# Patient Record
Sex: FEMALE | Race: WHITE | URBAN - METROPOLITAN AREA
[De-identification: names, ages, dates, MRNs, and addresses within clinical notes are randomized per-mention and may not be internally consistent; named-entity substitution may affect disease eponyms.]

---

## 2023-01-06 ENCOUNTER — OFFICE VISIT (OUTPATIENT)
Dept: ORTHOPEDIC SURGERY | Age: 43
End: 2023-01-06
Payer: COMMERCIAL

## 2023-01-06 DIAGNOSIS — R52 PAIN: ICD-10-CM

## 2023-01-06 DIAGNOSIS — M25.572 ACUTE LEFT ANKLE PAIN: Primary | ICD-10-CM

## 2023-01-06 DIAGNOSIS — M25.572 CHRONIC PAIN OF LEFT ANKLE: ICD-10-CM

## 2023-01-06 DIAGNOSIS — R52 PAIN: Primary | ICD-10-CM

## 2023-01-06 DIAGNOSIS — G89.29 CHRONIC PAIN OF LEFT ANKLE: ICD-10-CM

## 2023-01-06 PROCEDURE — L4396 STATIC OR DYNAMI AFO PRE CST: HCPCS | Performed by: ORTHOPAEDIC SURGERY

## 2023-01-06 PROCEDURE — 99204 OFFICE O/P NEW MOD 45 MIN: CPT | Performed by: ORTHOPAEDIC SURGERY

## 2023-01-06 RX ORDER — QUETIAPINE FUMARATE 100 MG/1
TABLET, FILM COATED ORAL
COMMUNITY
Start: 2022-10-11

## 2023-01-06 RX ORDER — MULTIVIT WITH MINERALS/LUTEIN
250 TABLET ORAL DAILY
COMMUNITY

## 2023-01-06 RX ORDER — MELOXICAM 15 MG/1
15 TABLET ORAL DAILY
Qty: 30 TABLET | Refills: 3 | Status: SHIPPED | OUTPATIENT
Start: 2023-01-06

## 2023-01-06 RX ORDER — THIAMINE MONONITRATE (VIT B1) 100 MG
TABLET ORAL DAILY
COMMUNITY

## 2023-01-06 RX ORDER — ESCITALOPRAM OXALATE 10 MG/1
TABLET ORAL
COMMUNITY
Start: 2022-10-11

## 2023-01-06 RX ORDER — FERROUS SULFATE 7.5 MG/0.5
15 SYRINGE (EA) ORAL DAILY
COMMUNITY

## 2023-01-06 NOTE — PROGRESS NOTES
Name: Kelly Sultana  YOB: 1980  Gender: female  MRN: [id number]    Summary: Bilateral plantars fasciitis. Night splints, home stretching exercises, meloxicam, power step inserts, [place] tennis shoes. If not improving will need alternative steroids and therapy. Bipolar 2 cannot take oral steroids    No x-rays needed       CC: Bilateral heel pain    HPI: Kelly Sultana is a 37 y.o. female presents today with medial arch and plantar heel/foot pain. The pain is a tearing burning and sharp sensation stabbing them in the heel. It is aggravated worse in the morning when they are getting out of bed are once they have been sitting around and try to stand back up. They state the morning time is the worst time and then throughout the day it is still very painful. This is been going on for 6 months. She recently bought Track[employer] which do help. She works on her feet at [employer] which she stands 12 hours on the day. She continues to have this heel pain. It limits her life. Her family including her boyfriend and son help her by icing it and massaging her foot. ROS/Meds/PSH/PMH/FH/SH: I personally reviewed the patients standard intake form. Below are the pertinents    Tobacco:  has no history on file for tobacco use. Diabetes: None  Other: none, bipolar    Physical Examination:  Bilateral Lower Extremity: FROM actively of toes, foot, ankle, knee and hip. No instability of foot or ankle with drawer and stress. 55 strength to TAEHLGSCPeronealsPTib. No skin lesions. TTP at the medial calcaneal insertion of the plantar fascia. This spot is very painful. Gastroc Contracture noted on silverskoid exam: With the hindfoot in neutral and forefoot supinated ankle dorsiflexion is diminished with knee in extension when compared to the knee at 90deg  Cavovarus foot posture when standing.    Talar tilt exam : normal  Anterior drawer exam w/ ankle plantarflexed at 20 deg: normal    Neuro:  normal SILT to s/s/sp/dp/t. Reflexes normal: 1+ patella reflex bilaterally, 1+ achilles reflex bilaterally, negative babinski bilaterally. no signs of hyper reflexia or absent reflex    Vascular: radial=4/4, femoral=4/4, popliteal=4/4, dorsalis pedis=4/4,     Imaging:   I independently interpreted XR taken today and   BILATERAL and X-Ray 3 vw Foot (AP/Lateral/Oblique) for foot pain   Findings: No signs of fracture or dislocations. The TMT joints are aligned, there are no signs of neoplastic disease, or chronic disease. The midtarsal and subtalar joints appear normal.  Both feet are symmetric however with large plantar enthesophytes to the plantar fascial insertion with some Jag's deformities noted on both sides with a little bit of enthesophytes noted the Achilles insertion. Impression:  Normal foot   Signature: Everett Matthew MD       BILATERAL and X-Ray Ankle 3 vw (AP/Lateral/Oblique) for ankle pain   Findings: No signs of displacement of the mortise. No signs of acute injury or fracture to the talus, mortise, fibula, or distal tibia. No signs of chronic damage, neoplastic process or infection   Impression:  Stable ankle    Signature: Everett Matthew MD       Assessment:   Bilateral plantars fasciitis   Bilateral gastroc contractures    Plan:   4 This is a chronic illness/condition with exacerbation and progression  Treatment at this time: Night splint, prescription drug management, power step inserts, good tennis shoes at home stretching exercise program.  Studies ordered: NO XR needed @ Next Visit    Weight-bearing status: WBAT        Return to work/work restrictions: none  Mobic 15mg p.o. qday x 14 days: An Rx was given. We discussed the use of Mobic. I advised not to combine it with other NSAIDS such as advil, motrin, nor aleve. I discussed Mobic and its affect on the GI system, its risk of ulcer formation/exacerbation.  I also discussed its affects on the kidneys and risk of nephritis and kidney damage. We discussed how it can alter your blood coagulability and limit platelet function, its negative affect on coronary artery disease, and how excessive alcohol use with Mobic can make all these problems worse.      Sonido Grace MD

## 2023-01-06 NOTE — PROGRESS NOTES
Patient was prescribed a Night splint for the patient's bilateral foot. The patient wears a size 7.5 shoe and I fitted the patient with a S size night splint. Additionally, I instructed the patient on proper use and care of device as well as ensuring patient could safely get device on and off. I explained to the patient that wearing the splint, the foot is held in a certain position, called dorsiflexion. This means that the fascia is stretched, not allowing it to contract and become tighter overnight. The great thing about a night splint is that the remedy is quite gentle and the fascia will be returned to its proper length over a period of time. Once stretched out, the plantar fascia will become less tense and therefore will cause less pain. Patient read and signed documenting they understand and agree to [facility]'s current DME return policy.

## 2023-11-27 ENCOUNTER — HOSPITAL ENCOUNTER (EMERGENCY)
Age: 43
Discharge: HOME | End: 2023-11-27
Attending: EMERGENCY MEDICINE
Payer: COMMERCIAL

## 2023-11-27 VITALS
BODY MASS INDEX: 43.87 KG/M2 | TEMPERATURE: 97.8 F | RESPIRATION RATE: 18 BRPM | WEIGHT: 257 LBS | OXYGEN SATURATION: 98 % | HEART RATE: 82 BPM | SYSTOLIC BLOOD PRESSURE: 148 MMHG | HEIGHT: 64 IN | DIASTOLIC BLOOD PRESSURE: 85 MMHG

## 2023-11-27 DIAGNOSIS — U07.1 COVID: Primary | ICD-10-CM

## 2023-11-27 LAB
FLUAV RNA SPEC QL NAA+PROBE: NOT DETECTED
FLUBV RNA SPEC QL NAA+PROBE: NOT DETECTED
SARS-COV-2 RDRP RESP QL NAA+PROBE: DETECTED
SOURCE: ABNORMAL
STREP, MOLECULAR: NOT DETECTED

## 2023-11-27 PROCEDURE — 99283 EMERGENCY DEPT VISIT LOW MDM: CPT

## 2023-11-27 PROCEDURE — 87635 SARS-COV-2 COVID-19 AMP PRB: CPT

## 2023-11-27 PROCEDURE — 87651 STREP A DNA AMP PROBE: CPT

## 2023-11-27 PROCEDURE — 87502 INFLUENZA DNA AMP PROBE: CPT

## 2023-11-27 RX ORDER — ALBUTEROL SULFATE 90 UG/1
2 AEROSOL, METERED RESPIRATORY (INHALATION) EVERY 6 HOURS PRN
Qty: 18 G | Refills: 3 | Status: SHIPPED | OUTPATIENT
Start: 2023-11-27

## 2023-11-27 RX ORDER — BENZONATATE 200 MG/1
200 CAPSULE ORAL 3 TIMES DAILY PRN
Qty: 30 CAPSULE | Refills: 0 | Status: SHIPPED | OUTPATIENT
Start: 2023-11-27 | End: 2023-12-04

## 2023-11-27 RX ORDER — NIRMATRELVIR AND RITONAVIR 150-100 MG
KIT ORAL
Qty: 20 TABLET | Refills: 0 | Status: SHIPPED | OUTPATIENT
Start: 2023-11-27 | End: 2023-12-02

## 2023-11-27 ASSESSMENT — ENCOUNTER SYMPTOMS: COUGH: 1

## 2023-11-27 NOTE — DISCHARGE INSTRUCTIONS
We would love to help you get a primary care doctor for follow-up after your emergency department visit. Please call [phone] between 7AM - 6PM Monday to Friday. A care navigator will be able to assist you with setting up a doctor close to your home.

## 2023-11-27 NOTE — ED PROVIDER NOTES
Emergency Department Provider Note       PCP: None, None   Age: 37 y.o. Sex: female     DISPOSITION Decision To Discharge 11/27/2023 09:34:54 AM       ICD-10-CM    1. COVID  U07.1           Medical Decision Making     Complexity of Problems Addressed:  1 or more acute illnesses that pose a threat to life or bodily function. Data Reviewed and Analyzed:   I independently ordered and reviewed each unique test.  I reviewed external records: provider visit note from PCP. Discussion of management or test interpretation. Patient is a 40-year-old female with a week of cold cough congestion and intermittent wheezing. Patient did have a sore throat earlier in the course of the symptoms. Patient denies any shortness of breath. Patient denies any tobacco or alcohol denies any other complaints and denies any shortness of breath. Patient states cough is nonproductive. Differential diagnosis includes but is not limited to COVID, flu, strep    Patient's physical exam is unremarkable lungs are clear to auscultation bilaterally. Patient's COVID test is positive here. Patient's O2 sat is 98% on room air with respiratory rate of 18 and patient has no significant respiratory distress. We will DC home with a prescription for Paxlovid, Tessalon Perles, albuterol MDI and have patient follow-up as needed. All questions answered. Risk of Complications and/or Morbidity of Patient Management:  Prescription drug management performed. Shared medical decision making was utilized in creating the patients health plan today. Is this patient to be included in the SEP-1 core measure due to severe sepsis or septic shock? No Exclusion criteria - the patient is NOT to be included for SEP-1 Core Measure due to: Infection is not suspected      History      Patient is a 40-year-old female with a week of cold cough congestion and intermittent wheezing.   Patient did have a sore throat earlier in the course of

## 2023-12-04 ENCOUNTER — HOSPITAL ENCOUNTER (EMERGENCY)
Age: 43
Discharge: HOME | End: 2023-12-05
Attending: EMERGENCY MEDICINE
Payer: COMMERCIAL

## 2023-12-04 ENCOUNTER — APPOINTMENT (OUTPATIENT)
Dept: GENERAL RADIOLOGY | Age: 43
End: 2023-12-04
Payer: COMMERCIAL

## 2023-12-04 ENCOUNTER — APPOINTMENT (OUTPATIENT)
Dept: CT IMAGING | Age: 43
End: 2023-12-04
Payer: COMMERCIAL

## 2023-12-04 DIAGNOSIS — E87.6 HYPOKALEMIA: Primary | ICD-10-CM

## 2023-12-04 LAB
ALBUMIN SERPL-MCNC: 3.7 G/DL (ref 3.5–5)
ALBUMIN/GLOB SERPL: 0.8 (ref 0.4–1.6)
ALP SERPL-CCNC: 99 U/L (ref 50–136)
ALT SERPL-CCNC: 34 U/L (ref 12–65)
ANION GAP SERPL CALC-SCNC: 3 MMOL/L (ref 2–11)
AST SERPL-CCNC: 52 U/L (ref 15–37)
BASOPHILS # BLD: 0 K/UL (ref 0–0.2)
BASOPHILS NFR BLD: 0 % (ref 0–2)
BILIRUB SERPL-MCNC: 0.4 MG/DL (ref 0.2–1.1)
BUN SERPL-MCNC: 13 MG/DL (ref 6–23)
CALCIUM SERPL-MCNC: 8.6 MG/DL (ref 8.3–10.4)
CHLORIDE SERPL-SCNC: 105 MMOL/L (ref 101–110)
CO2 SERPL-SCNC: 25 MMOL/L (ref 21–32)
CREAT SERPL-MCNC: 0.7 MG/DL (ref 0.6–1)
D DIMER PPP FEU-MCNC: 1.38 UG/ML(FEU)
DIFFERENTIAL METHOD BLD: ABNORMAL
EOSINOPHIL # BLD: 0 K/UL (ref 0–0.8)
EOSINOPHIL NFR BLD: 1 % (ref 0.5–7.8)
ERYTHROCYTE [DISTWIDTH] IN BLOOD BY AUTOMATED COUNT: 15.9 % (ref 11.9–14.6)
FLUAV RNA SPEC QL NAA+PROBE: NOT DETECTED
FLUBV RNA SPEC QL NAA+PROBE: NOT DETECTED
GLOBULIN SER CALC-MCNC: 4.4 G/DL (ref 2.8–4.5)
GLUCOSE SERPL-MCNC: 117 MG/DL (ref 65–100)
HCT VFR BLD AUTO: 40.3 % (ref 35.8–46.3)
HGB BLD-MCNC: 13.1 G/DL (ref 11.7–15.4)
IMM GRANULOCYTES # BLD AUTO: 0 K/UL (ref 0–0.5)
IMM GRANULOCYTES NFR BLD AUTO: 1 % (ref 0–5)
LYMPHOCYTES # BLD: 1.2 K/UL (ref 0.5–4.6)
LYMPHOCYTES NFR BLD: 19 % (ref 13–44)
MAGNESIUM SERPL-MCNC: 2.4 MG/DL (ref 1.8–2.4)
MCH RBC QN AUTO: 28.9 PG (ref 26.1–32.9)
MCHC RBC AUTO-ENTMCNC: 32.5 G/DL (ref 31.4–35)
MCV RBC AUTO: 89 FL (ref 82–102)
MONOCYTES # BLD: 0.5 K/UL (ref 0.1–1.3)
MONOCYTES NFR BLD: 8 % (ref 4–12)
NEUTS SEG # BLD: 4.3 K/UL (ref 1.7–8.2)
NEUTS SEG NFR BLD: 71 % (ref 43–78)
NRBC # BLD: 0 K/UL (ref 0–0.2)
PLATELET # BLD AUTO: 255 K/UL (ref 150–450)
PMV BLD AUTO: 10.9 FL (ref 9.4–12.3)
POTASSIUM SERPL-SCNC: 2.1 MMOL/L (ref 3.5–5.1)
POTASSIUM SERPL-SCNC: 5.9 MMOL/L (ref 3.5–5.1)
PROT SERPL-MCNC: 8.1 G/DL (ref 6.3–8.2)
RBC # BLD AUTO: 4.53 M/UL (ref 4.05–5.2)
SODIUM SERPL-SCNC: 133 MMOL/L (ref 133–143)
WBC # BLD AUTO: 6 K/UL (ref 4.3–11.1)

## 2023-12-04 PROCEDURE — 96365 THER/PROPH/DIAG IV INF INIT: CPT

## 2023-12-04 PROCEDURE — 71045 X-RAY EXAM CHEST 1 VIEW: CPT

## 2023-12-04 PROCEDURE — 2580000003 HC RX 258

## 2023-12-04 PROCEDURE — 85379 FIBRIN DEGRADATION QUANT: CPT

## 2023-12-04 PROCEDURE — 96366 THER/PROPH/DIAG IV INF ADDON: CPT

## 2023-12-04 PROCEDURE — 93005 ELECTROCARDIOGRAM TRACING: CPT | Performed by: EMERGENCY MEDICINE

## 2023-12-04 PROCEDURE — 71260 CT THORAX DX C+: CPT

## 2023-12-04 PROCEDURE — 94640 AIRWAY INHALATION TREATMENT: CPT

## 2023-12-04 PROCEDURE — 6360000004 HC RX CONTRAST MEDICATION

## 2023-12-04 PROCEDURE — 99285 EMERGENCY DEPT VISIT HI MDM: CPT

## 2023-12-04 PROCEDURE — 80053 COMPREHEN METABOLIC PANEL: CPT

## 2023-12-04 PROCEDURE — 85025 COMPLETE CBC W/AUTO DIFF WBC: CPT

## 2023-12-04 PROCEDURE — 96361 HYDRATE IV INFUSION ADD-ON: CPT

## 2023-12-04 PROCEDURE — 87502 INFLUENZA DNA AMP PROBE: CPT

## 2023-12-04 PROCEDURE — 84132 ASSAY OF SERUM POTASSIUM: CPT

## 2023-12-04 PROCEDURE — 6360000002 HC RX W HCPCS

## 2023-12-04 PROCEDURE — 6370000000 HC RX 637 (ALT 250 FOR IP)

## 2023-12-04 PROCEDURE — 94762 N-INVAS EAR/PLS OXIMTRY CONT: CPT

## 2023-12-04 PROCEDURE — 83735 ASSAY OF MAGNESIUM: CPT

## 2023-12-04 RX ORDER — POTASSIUM CHLORIDE 20 MEQ/1
40 TABLET, EXTENDED RELEASE ORAL ONCE
Status: COMPLETED | OUTPATIENT
Start: 2023-12-04 | End: 2023-12-05

## 2023-12-04 RX ORDER — IPRATROPIUM BROMIDE AND ALBUTEROL SULFATE 2.5; .5 MG/3ML; MG/3ML
1 SOLUTION RESPIRATORY (INHALATION)
Status: COMPLETED | OUTPATIENT
Start: 2023-12-04 | End: 2023-12-04

## 2023-12-04 RX ORDER — DEXAMETHASONE SODIUM PHOSPHATE 10 MG/ML
6 INJECTION INTRAMUSCULAR; INTRAVENOUS
Status: COMPLETED | OUTPATIENT
Start: 2023-12-04 | End: 2023-12-04

## 2023-12-04 RX ORDER — 0.9 % SODIUM CHLORIDE 0.9 %
1000 INTRAVENOUS SOLUTION INTRAVENOUS ONCE
Status: COMPLETED | OUTPATIENT
Start: 2023-12-04 | End: 2023-12-04

## 2023-12-04 RX ORDER — POTASSIUM CHLORIDE 7.45 MG/ML
10 INJECTION INTRAVENOUS
Status: COMPLETED | OUTPATIENT
Start: 2023-12-05 | End: 2023-12-05

## 2023-12-04 RX ADMIN — IPRATROPIUM BROMIDE AND ALBUTEROL SULFATE 1 DOSE: .5; 3 SOLUTION RESPIRATORY (INHALATION) at 22:07

## 2023-12-04 RX ADMIN — SODIUM CHLORIDE 1000 ML: 9 INJECTION, SOLUTION INTRAVENOUS at 22:19

## 2023-12-04 RX ADMIN — DEXAMETHASONE SODIUM PHOSPHATE 6 MG: 10 INJECTION INTRAMUSCULAR; INTRAVENOUS at 22:18

## 2023-12-04 RX ADMIN — IOPAMIDOL 100 ML: 755 INJECTION, SOLUTION INTRAVENOUS at 23:46

## 2023-12-04 ASSESSMENT — ENCOUNTER SYMPTOMS: COUGH: 1

## 2023-12-04 ASSESSMENT — PAIN - FUNCTIONAL ASSESSMENT: PAIN_FUNCTIONAL_ASSESSMENT: NONE - DENIES PAIN

## 2023-12-05 VITALS
WEIGHT: 287.2 LBS | TEMPERATURE: 97.8 F | BODY MASS INDEX: 49.03 KG/M2 | SYSTOLIC BLOOD PRESSURE: 128 MMHG | HEART RATE: 75 BPM | OXYGEN SATURATION: 98 % | RESPIRATION RATE: 16 BRPM | DIASTOLIC BLOOD PRESSURE: 68 MMHG | HEIGHT: 64 IN

## 2023-12-05 PROBLEM — E87.6 HYPOKALEMIA: Status: ACTIVE | Noted: 2023-12-05

## 2023-12-05 LAB
ALBUMIN SERPL-MCNC: 3.4 G/DL (ref 3.5–5)
ALBUMIN/GLOB SERPL: 0.9 (ref 0.4–1.6)
ALP SERPL-CCNC: 98 U/L (ref 50–136)
ALT SERPL-CCNC: 31 U/L (ref 12–65)
ANION GAP SERPL CALC-SCNC: 6 MMOL/L (ref 2–11)
AST SERPL-CCNC: 22 U/L (ref 15–37)
BILIRUB SERPL-MCNC: 0.3 MG/DL (ref 0.2–1.1)
BUN SERPL-MCNC: 10 MG/DL (ref 6–23)
CALCIUM SERPL-MCNC: 8.4 MG/DL (ref 8.3–10.4)
CHLORIDE SERPL-SCNC: 109 MMOL/L (ref 103–113)
CO2 SERPL-SCNC: 19 MMOL/L (ref 21–32)
CREAT SERPL-MCNC: 0.6 MG/DL (ref 0.6–1)
EKG ATRIAL RATE: 103 BPM
EKG DIAGNOSIS: NORMAL
EKG P AXIS: 49 DEGREES
EKG P-R INTERVAL: 124 MS
EKG Q-T INTERVAL: 338 MS
EKG QRS DURATION: 76 MS
EKG QTC CALCULATION (BAZETT): 442 MS
EKG R AXIS: 53 DEGREES
EKG T AXIS: 22 DEGREES
EKG VENTRICULAR RATE: 103 BPM
GLOBULIN SER CALC-MCNC: 4 G/DL (ref 2.8–4.5)
GLUCOSE SERPL-MCNC: 170 MG/DL (ref 65–100)
MAGNESIUM SERPL-MCNC: 1.2 MG/DL (ref 1.8–2.4)
POTASSIUM SERPL-SCNC: 5.8 MMOL/L (ref 3.5–5.1)
PROT SERPL-MCNC: 7.4 G/DL (ref 6.3–8.2)
SODIUM SERPL-SCNC: 134 MMOL/L (ref 136–146)

## 2023-12-05 PROCEDURE — 96365 THER/PROPH/DIAG IV INF INIT: CPT

## 2023-12-05 PROCEDURE — 80053 COMPREHEN METABOLIC PANEL: CPT

## 2023-12-05 PROCEDURE — 6370000000 HC RX 637 (ALT 250 FOR IP)

## 2023-12-05 PROCEDURE — 96366 THER/PROPH/DIAG IV INF ADDON: CPT

## 2023-12-05 PROCEDURE — 93010 ELECTROCARDIOGRAM REPORT: CPT | Performed by: INTERNAL MEDICINE

## 2023-12-05 PROCEDURE — 6360000002 HC RX W HCPCS

## 2023-12-05 RX ORDER — MAGNESIUM SULFATE IN WATER 40 MG/ML
4000 INJECTION, SOLUTION INTRAVENOUS ONCE
Status: DISCONTINUED | OUTPATIENT
Start: 2023-12-05 | End: 2023-12-05

## 2023-12-05 RX ORDER — MAGNESIUM SULFATE IN WATER 40 MG/ML
4000 INJECTION, SOLUTION INTRAVENOUS ONCE
Status: COMPLETED | OUTPATIENT
Start: 2023-12-05 | End: 2023-12-05

## 2023-12-05 RX ADMIN — POTASSIUM CHLORIDE 10 MEQ: 7.46 INJECTION, SOLUTION INTRAVENOUS at 00:31

## 2023-12-05 RX ADMIN — MAGNESIUM SULFATE HEPTAHYDRATE 4000 MG: 40 INJECTION, SOLUTION INTRAVENOUS at 04:30

## 2023-12-05 RX ADMIN — POTASSIUM CHLORIDE 10 MEQ: 7.46 INJECTION, SOLUTION INTRAVENOUS at 03:50

## 2023-12-05 RX ADMIN — POTASSIUM CHLORIDE 10 MEQ: 7.46 INJECTION, SOLUTION INTRAVENOUS at 02:50

## 2023-12-05 RX ADMIN — POTASSIUM CHLORIDE 40 MEQ: 1500 TABLET, EXTENDED RELEASE ORAL at 00:26

## 2023-12-05 RX ADMIN — POTASSIUM CHLORIDE 10 MEQ: 7.46 INJECTION, SOLUTION INTRAVENOUS at 04:50

## 2023-12-05 ASSESSMENT — PAIN SCALES - GENERAL: PAINLEVEL_OUTOF10: 0

## 2023-12-05 NOTE — ED TRIAGE NOTES
C/o SOB and eyes burning, leg pain, and cough, was dx with covid last week but not any better with same presenting symptoms

## 2023-12-05 NOTE — PROGRESS NOTES
Patient was handed over to me at shift change pending potassium repeat. Her EPOCH potassium came back at 5.3. At this point patient remains clinically stable for discharge. She was given follow-up with her primary care doctor as well as return precautions to the ER.   Patient is stable on discharge examination

## 2023-12-05 NOTE — DISCHARGE INSTRUCTIONS
Please follow-up with your primary care doctor for repeat check of your lab results. Your potassium did normalize here. I recommend you continue to stay adequately hydrated with adequate food intake. Please return to the ER for any worsening symptoms.

## 2023-12-05 NOTE — ED PROVIDER NOTES
(VITAMIN D) 10 MCG (400 UNIT) CAPS Capsule Take 400 Units by mouth dailyHistorical Med      ferrous sulfate (JOHNNY-IN-SOL) 75 (15 Fe) MG/ML solution Take 15 mg by mouth dailyHistorical Med      meloxicam (MOBIC) 15 MG tablet Take 1 tablet by mouth daily, Disp-30 tablet, R-3Normal              Results for orders placed or performed during the hospital encounter of 12/04/23   Influenza A/B, Molecular    Specimen: Nasopharyngeal   Result Value Ref Range    Influenza A, DAA Not detected NOTD      Influenza B, ADA Not detected NOTD     XR CHEST PORTABLE    Narrative    Chest X-ray    INDICATION: Shortness of breath. AP/PA view of the chest was obtained. FINDINGS: The lungs are clear. There are no infiltrates or effusions. The heart  size is normal.  The bony thorax is intact. Impression    No acute findings in the chest     CT CHEST PULMONARY EMBOLISM W CONTRAST    Narrative    EXAMINATION:    CTA CHEST         CLINICAL HISTORY:    Cough, elevated D-dimer. COMPARISON:  There is no prior similar study available for correlation. TECHNIQUE: Axial imaging along with 2-D and 3-D postprocessing performed from  the level of the thoracic inlet to the kidneys following the administration of  IV contrast and utilizing CTA protocol. This exam was performed according to  our departmental dose-optimization program, which includes automated exposure  control, adjustment of the mA and/or kV according to patient size and/or use of  iterative reconstruction technique. FINDINGS:    No pulmonary artery filling defects are identified to indicate pulmonary  embolism. The heart is normal in size. There is no pericardial effusion. The thoracic  aorta is normal in caliber without evidence of dissection. No mediastinal, hilar, or axillary lymphadenopathy. No focal consolidation, pleural effusion, or pneumothorax. No suspicious  pulmonary nodules or masses are identified.     Partially visualized portions of the
Comprehensive Metabolic Panel   Result Value Ref Range    Sodium 133 133 - 143 mmol/L    Potassium 5.9 (H) 3.5 - 5.1 mmol/L    Chloride 105 101 - 110 mmol/L    CO2 25 21 - 32 mmol/L    Anion Gap 3 2 - 11 mmol/L    Glucose 117 (H) 65 - 100 mg/dL    BUN 13 6 - 23 MG/DL    Creatinine 0.70 0.6 - 1.0 MG/DL    Est, Glom Filt Rate >60 >60 ml/min/1.73m2    Calcium 8.6 8.3 - 10.4 MG/DL    Total Bilirubin 0.4 0.2 - 1.1 MG/DL    ALT 34 12 - 65 U/L    AST 52 (H) 15 - 37 U/L    Alk Phosphatase 99 50 - 136 U/L    Total Protein 8.1 6.3 - 8.2 g/dL    Albumin 3.7 3.5 - 5.0 g/dL    Globulin 4.4 2.8 - 4.5 g/dL    Albumin/Globulin Ratio 0.8 0.4 - 1.6     Magnesium   Result Value Ref Range    Magnesium 2.4 1.8 - 2.4 mg/dL   D-Dimer, Quantitative   Result Value Ref Range    D-Dimer, Quant 1.38 (H) <0.56 ug/ml(FEU)   Potassium   Result Value Ref Range    Potassium 2.1 (LL) 3.5 - 5.1 mmol/L   EKG 12 Lead   Result Value Ref Range    Ventricular Rate 103 BPM    Atrial Rate 103 BPM    P-R Interval 124 ms    QRS Duration 76 ms    Q-T Interval 338 ms    QTc Calculation (Bazett) 442 ms    P Axis 49 degrees    R Axis 53 degrees    T Axis 22 degrees    Diagnosis       Sinus tachycardia  Otherwise normal ECG  No previous ECGs available          CT CHEST PULMONARY EMBOLISM W CONTRAST   Final Result   1. No pulmonary embolism. 2. The lungs are clear. XR CHEST PORTABLE   Final Result   No acute findings in the chest                           Voice dictation software was used during the making of this note. This software is not perfect and grammatical and other typographical errors may be present. This note has not been completely proofread for errors.

## 2023-12-05 NOTE — ED NOTES
I have reviewed discharge instructions with the patient. The patient verbalized understanding. Patient left ED via Discharge Method: ambulatory to Home with self    Opportunity for questions and clarification provided. Patient given 0 scripts. To continue your aftercare when you leave the hospital, you may receive an automated call from our care team to check in on how you are doing. This is a free service and part of our promise to provide the best care and service to meet your aftercare needs. \" If you have questions, or wish to unsubscribe from this service please call [phone]. Thank you for Choosing our [facility] Emergency Department.         Wisam Garcia RN  12/05/23 [id number]

## 2023-12-11 LAB
ANION GAP BLD CALC-SCNC: 9.7 MMOL/L
BUN BLD-MCNC: 10 MG/DL (ref 8–26)
CHLORIDE BLD-SCNC: 106 MMOL/L (ref 98–107)
CO2 BLD-SCNC: 22.3 MMOL/L (ref 21–32)
CREAT BLD-MCNC: 0.36 MG/DL (ref 0.8–1.5)
GLUCOSE BLD-MCNC: 182 MG/DL (ref 65–100)
POTASSIUM BLD-SCNC: 5.3 MMOL/L (ref 3.5–5.1)
SODIUM BLD-SCNC: 138 MMOL/L (ref 136–145)

## 2023-12-25 ENCOUNTER — HOSPITAL ENCOUNTER (EMERGENCY)
Age: 43
Discharge: HOME | End: 2023-12-25
Payer: COMMERCIAL

## 2023-12-25 VITALS
SYSTOLIC BLOOD PRESSURE: 138 MMHG | HEIGHT: 64 IN | TEMPERATURE: 99.6 F | OXYGEN SATURATION: 96 % | BODY MASS INDEX: 49 KG/M2 | HEART RATE: 90 BPM | WEIGHT: 287 LBS | DIASTOLIC BLOOD PRESSURE: 76 MMHG | RESPIRATION RATE: 18 BRPM

## 2023-12-25 DIAGNOSIS — J10.1 INFLUENZA A: Primary | ICD-10-CM

## 2023-12-25 LAB
FLUAV RNA SPEC QL NAA+PROBE: DETECTED
FLUBV RNA SPEC QL NAA+PROBE: NOT DETECTED
SARS-COV-2 RDRP RESP QL NAA+PROBE: NOT DETECTED
SOURCE: NORMAL

## 2023-12-25 PROCEDURE — 87635 SARS-COV-2 COVID-19 AMP PRB: CPT

## 2023-12-25 PROCEDURE — 99283 EMERGENCY DEPT VISIT LOW MDM: CPT

## 2023-12-25 PROCEDURE — 87502 INFLUENZA DNA AMP PROBE: CPT

## 2023-12-25 NOTE — DISCHARGE INSTRUCTIONS
Your swabs resulted positive for flu. This accounts for your symptoms.   Continue Tylenol Cold and flu at home for symptomatic improvement

## 2023-12-25 NOTE — ED TRIAGE NOTES
Patient to ER via POV with c/o of cough, fever,  congestion, and nausea starting Friday, and has gotten worse since. Pt states a co-worker came in sick and passed it around and now she is sick.

## 2024-03-07 ENCOUNTER — HOSPITAL ENCOUNTER (EMERGENCY)
Age: 44
Discharge: HOME | End: 2024-03-07
Payer: COMMERCIAL

## 2024-03-07 VITALS
OXYGEN SATURATION: 98 % | WEIGHT: 278 LBS | DIASTOLIC BLOOD PRESSURE: 74 MMHG | TEMPERATURE: 97.9 F | RESPIRATION RATE: 17 BRPM | BODY MASS INDEX: 47.46 KG/M2 | SYSTOLIC BLOOD PRESSURE: 124 MMHG | HEART RATE: 91 BPM | HEIGHT: 64 IN

## 2024-03-07 DIAGNOSIS — R30.0 DYSURIA: Primary | ICD-10-CM

## 2024-03-07 LAB
BILIRUB UR QL: NEGATIVE
GLUCOSE UR QL STRIP.AUTO: NEGATIVE MG/DL
HCG UR QL: NEGATIVE
KETONES UR-MCNC: NEGATIVE MG/DL
LEUKOCYTE ESTERASE UR QL STRIP: NEGATIVE
NITRITE UR QL: NEGATIVE
PH UR: 6 (ref 5–9)
PROT UR QL: NEGATIVE MG/DL
RBC # UR STRIP: NEGATIVE
SERVICE CMNT-IMP: ABNORMAL
SP GR UR: >1.03 (ref 1–1.02)
UROBILINOGEN UR QL: 1 EU/DL (ref 0.2–1)

## 2024-03-07 PROCEDURE — 81003 URINALYSIS AUTO W/O SCOPE: CPT

## 2024-03-07 PROCEDURE — 81025 URINE PREGNANCY TEST: CPT

## 2024-03-07 PROCEDURE — 99283 EMERGENCY DEPT VISIT LOW MDM: CPT

## 2024-03-07 PROCEDURE — 87086 URINE CULTURE/COLONY COUNT: CPT

## 2024-03-07 RX ORDER — CEPHALEXIN 500 MG/1
500 CAPSULE ORAL 4 TIMES DAILY
Qty: 28 CAPSULE | Refills: 0 | Status: SHIPPED | OUTPATIENT
Start: 2024-03-07 | End: 2024-03-14

## 2024-03-07 RX ORDER — METHENAMINE, SODIUM PHOSPHATE, MONOBASIC, MONOHYDRATE, PHENYL SALICYLATE, METHYLENE BLUE, AND HYOSCYAMINE SULFATE 118; 40.8; 36; 10; .12 MG/1; MG/1; MG/1; MG/1; MG/1
118 CAPSULE ORAL 4 TIMES DAILY PRN
Qty: 28 CAPSULE | Refills: 0 | Status: SHIPPED | OUTPATIENT
Start: 2024-03-07 | End: 2024-03-14

## 2024-03-07 RX ORDER — PHENAZOPYRIDINE HYDROCHLORIDE 95 MG/1
95 TABLET ORAL
Status: DISCONTINUED | OUTPATIENT
Start: 2024-03-07 | End: 2024-03-07

## 2024-03-07 ASSESSMENT — ENCOUNTER SYMPTOMS
VOMITING: 0
ABDOMINAL PAIN: 0
COUGH: 0
SHORTNESS OF BREATH: 0
PHOTOPHOBIA: 0
TROUBLE SWALLOWING: 0
FACIAL SWELLING: 0

## 2024-03-07 ASSESSMENT — PAIN - FUNCTIONAL ASSESSMENT
PAIN_FUNCTIONAL_ASSESSMENT: NONE - DENIES PAIN
PAIN_FUNCTIONAL_ASSESSMENT: NONE - DENIES PAIN

## 2024-03-07 ASSESSMENT — LIFESTYLE VARIABLES
HOW MANY STANDARD DRINKS CONTAINING ALCOHOL DO YOU HAVE ON A TYPICAL DAY: PATIENT DECLINED
HOW OFTEN DO YOU HAVE A DRINK CONTAINING ALCOHOL: PATIENT DECLINED

## 2024-03-07 NOTE — ED TRIAGE NOTES
Patient arrives with c/o dysuria, as well as burning with urination for past 4 days. Denies abd pain/SOB or CP. Denies any recent fevers.

## 2024-03-08 NOTE — ED NOTES
I have reviewed discharge instructions with the patient.  The patient verbalized understanding.    Patient left ED via Discharge Method: ambulatory to Home with self.    Opportunity for questions and clarification provided.       Patient given 2 scripts.         To continue your aftercare when you leave the hospital, you may receive an automated call from our care team to check in on how you are doing.  This is a free service and part of our promise to provide the best care and service to meet your aftercare needs.” If you have questions, or wish to unsubscribe from this service please call [phone].  Thank you for Choosing our [facility] Emergency Department.         Katie Hinojosa LPN  03/07/24 [id number]

## 2024-03-08 NOTE — ED PROVIDER NOTES
Emergency Department Provider Note       PCP: None, None   Age: 44 y.o.   Sex: female     DISPOSITION Decision To Discharge 03/07/2024 07:16:58 PM       ICD-10-CM    1. Dysuria  R30.0           Medical Decision Making     In summary this is a 44-year-old female patient who presented for evaluation of symptoms most consistent with urinary tract infection.  Her vitals are stable and she does not meet criteria for sepsis.  She has no CVA tenderness or findings concerning for pyelonephritis.  Abdominal exam is soft and nontender without concerning findings for acute abdominal pathology.  No reported vaginal discharge or concerns for STIs to suggest pelvic inflammatory disease.  Does have a history of UTIs and reports this feels similar which is quite sensitive for the diagnosis.  Although urine was negative, I do feel this patient clinically has a UTI.  We will treat her with antibiotics and send the urine for culture.  If culture negative, we can stop the antibiotics. Counseled on signs to return for.  The patient has verbalized understanding and agreed with the plan.  Discharged in stable condition.     1 acute, uncomplicated illness or injury.  Prescription drug management performed.  Patient was discharged risks and benefits of hospitalization were considered.  Shared medical decision making was utilized in creating the patients health plan today.  ED attending physician present in department at time of care. Based on current hospital policy, their co-signature is not required on this note.   I independently ordered and reviewed each unique test.                     History     44-year-old female patient presents today complaining of possible UTI.  Reports for the past 4 days she has had dysuria, urinary frequency, urinary urgency and hesitancy.  States it feels exactly similar to other urinary tract infection she has had.  She denies any systemic symptoms such as fevers or vomiting.  Denies any back pain or

## 2024-03-08 NOTE — DISCHARGE INSTRUCTIONS
Please take the antibiotics for the full course.  Use Uribel as needed for pain.  This may turn your urine blue.  This is a normal side effect which will resolve after treatment is complete.  Return here for new or worsening symptoms    As we discussed, I did not find a life threatening cause of your symptoms today. However, THAT DOES NOT MEAN IT COULD NOT DEVELOP. If you develop ANY new or worsening symptoms, it is critical that you return for re-evaluation. This includes any symptoms that are concerning to you, especially symptoms such as abdominal pain, back pain, fevers, vomiting.  If you do not return for re-evaluation, you risk serious complications, including death.

## 2024-03-10 LAB
BACTERIA SPEC CULT: NORMAL
BACTERIA SPEC CULT: NORMAL
SERVICE CMNT-IMP: NORMAL

## 2024-12-16 ENCOUNTER — HOSPITAL ENCOUNTER (EMERGENCY)
Age: 44
Discharge: HOME | End: 2024-12-16
Payer: COMMERCIAL

## 2024-12-16 ENCOUNTER — APPOINTMENT (OUTPATIENT)
Dept: GENERAL RADIOLOGY | Age: 44
End: 2024-12-16
Payer: COMMERCIAL

## 2024-12-16 VITALS
DIASTOLIC BLOOD PRESSURE: 60 MMHG | RESPIRATION RATE: 17 BRPM | HEART RATE: 72 BPM | OXYGEN SATURATION: 98 % | TEMPERATURE: 98.1 F | BODY MASS INDEX: 42.68 KG/M2 | SYSTOLIC BLOOD PRESSURE: 145 MMHG | HEIGHT: 64 IN | WEIGHT: 250 LBS

## 2024-12-16 DIAGNOSIS — J40 BRONCHITIS: Primary | ICD-10-CM

## 2024-12-16 LAB
SARS-COV-2 RDRP RESP QL NAA+PROBE: NOT DETECTED
SOURCE: NORMAL

## 2024-12-16 PROCEDURE — 71046 X-RAY EXAM CHEST 2 VIEWS: CPT

## 2024-12-16 PROCEDURE — 99284 EMERGENCY DEPT VISIT MOD MDM: CPT

## 2024-12-16 PROCEDURE — 96372 THER/PROPH/DIAG INJ SC/IM: CPT

## 2024-12-16 PROCEDURE — 87635 SARS-COV-2 COVID-19 AMP PRB: CPT

## 2024-12-16 PROCEDURE — 6360000002 HC RX W HCPCS

## 2024-12-16 RX ORDER — ALBUTEROL SULFATE 90 UG/1
2 INHALANT RESPIRATORY (INHALATION) 4 TIMES DAILY PRN
Qty: 18 G | Refills: 5 | Status: SHIPPED | OUTPATIENT
Start: 2024-12-16

## 2024-12-16 RX ORDER — DEXAMETHASONE SODIUM PHOSPHATE 10 MG/ML
10 INJECTION INTRAMUSCULAR; INTRAVENOUS ONCE
Status: COMPLETED | OUTPATIENT
Start: 2024-12-16 | End: 2024-12-16

## 2024-12-16 RX ORDER — IPRATROPIUM BROMIDE AND ALBUTEROL SULFATE 2.5; .5 MG/3ML; MG/3ML
1 SOLUTION RESPIRATORY (INHALATION)
Status: DISCONTINUED | OUTPATIENT
Start: 2024-12-16 | End: 2024-12-17 | Stop reason: HOSPADM

## 2024-12-16 RX ORDER — AZITHROMYCIN 250 MG/1
TABLET, FILM COATED ORAL
Qty: 6 TABLET | Refills: 0 | Status: SHIPPED | OUTPATIENT
Start: 2024-12-16 | End: 2024-12-26

## 2024-12-16 RX ORDER — PREDNISONE 50 MG/1
50 TABLET ORAL DAILY
Qty: 5 TABLET | Refills: 0 | Status: SHIPPED | OUTPATIENT
Start: 2024-12-16 | End: 2024-12-21

## 2024-12-16 RX ADMIN — DEXAMETHASONE SODIUM PHOSPHATE 10 MG: 10 INJECTION INTRAMUSCULAR; INTRAVENOUS at 22:23

## 2024-12-16 ASSESSMENT — LIFESTYLE VARIABLES
HOW MANY STANDARD DRINKS CONTAINING ALCOHOL DO YOU HAVE ON A TYPICAL DAY: PATIENT DOES NOT DRINK
HOW OFTEN DO YOU HAVE A DRINK CONTAINING ALCOHOL: NEVER

## 2024-12-17 NOTE — DISCHARGE INSTRUCTIONS
Your chest x-ray looked good, no sign of pneumonia.  Do believe you have bronchitis.  I have sent in antibiotics and steroids to the pharmacy for you as well as an albuterol inhaler.  You can take over-the-counter cough medications as needed as well.  Continue to closely monitor your symptoms at home.    Return to the ED immediately if you begin to experience any shortness of breath, chest pain, fever, or any new or worsening symptoms.

## 2024-12-17 NOTE — ED PROVIDER NOTES
Emergency Department Provider Note       PCP: None, None   Age: 44 y.o.   Sex: female     DISPOSITION Discharge - Pending Orders Complete 12/16/2024 10:50:55 PM    ICD-10-CM    1. Bronchitis  J40           Medical Decision Making     Patient is a 44-year-old female presenting with dry cough for the last week and a few days.  Patient states over the past week and 2 days she has had a worsening dry cough.  She states she had an episode of posttussive emesis earlier today.  She denies any productive cough or fever    On presentation, patient is afebrile, vital signs are stable without any tachycardia, hypoxia, or any other abnormalities.  She is well-appearing in no acute distress.  She does have a dry cough noted on exam.  She does have some wheezes auscultated in her lung fields without any obvious crackles or rhonchi.  HEENT exam benign and reassuring.    Do believe patient's symptoms sound consistent with bronchitis, will check a chest x-ray to evaluate for any pneumonias given duration of patient's cough, will also check a COVID swab.  Will provide with a DuoNeb treatment and Decadron.    X-ray does not reveal any evidence of pneumonia or other acute abnormalities.  COVID is negative.    Due to stable vital signs, nontoxic appearance, no sign of any chest pain, hypoxia, sepsis, SIRS, Wells and PERC negative, or any other emergent pathology plan for this patient as continued outpatient management for most likely bronchitis.  Given duration and worsening of her symptoms we will begin her on azithromycin to cover for any atypical type infection.  Will send her home with steroids as well to continue to take in an albuterol inhaler to use as needed for wheezing.  She will follow-up with her primary care provider to ensure improvement in her symptoms.  She was given strict return precautions.  She verbalized understanding with plan of care and left the facility in stable condition.     1 acute complicated illness or

## 2024-12-17 NOTE — ED TRIAGE NOTES
C/o cough since last week, has taken OTC meds with no relief. Cough so bad she has trouble catching her breath. One episode of vomiting after coughing hard. Denies lung dx of any kind. Notes dry cough as only symptom.

## 2025-01-04 ENCOUNTER — HOSPITAL ENCOUNTER (EMERGENCY)
Age: 45
Discharge: HOME | End: 2025-01-04
Payer: COMMERCIAL

## 2025-01-04 VITALS
RESPIRATION RATE: 19 BRPM | DIASTOLIC BLOOD PRESSURE: 77 MMHG | HEART RATE: 76 BPM | BODY MASS INDEX: 42.68 KG/M2 | WEIGHT: 250 LBS | TEMPERATURE: 98.4 F | HEIGHT: 64 IN | SYSTOLIC BLOOD PRESSURE: 150 MMHG | OXYGEN SATURATION: 98 %

## 2025-01-04 DIAGNOSIS — J06.9 ACUTE UPPER RESPIRATORY INFECTION: Primary | ICD-10-CM

## 2025-01-04 LAB
FLUAV RNA SPEC QL NAA+PROBE: NOT DETECTED
FLUBV RNA SPEC QL NAA+PROBE: NOT DETECTED
SARS-COV-2 RDRP RESP QL NAA+PROBE: NOT DETECTED
SOURCE: NORMAL
STREP, MOLECULAR: NOT DETECTED

## 2025-01-04 PROCEDURE — 99283 EMERGENCY DEPT VISIT LOW MDM: CPT

## 2025-01-04 PROCEDURE — 87635 SARS-COV-2 COVID-19 AMP PRB: CPT

## 2025-01-04 PROCEDURE — 87651 STREP A DNA AMP PROBE: CPT

## 2025-01-04 PROCEDURE — 6370000000 HC RX 637 (ALT 250 FOR IP): Performed by: PHYSICIAN ASSISTANT

## 2025-01-04 PROCEDURE — 87502 INFLUENZA DNA AMP PROBE: CPT

## 2025-01-04 RX ORDER — BENZONATATE 200 MG/1
200 CAPSULE ORAL 3 TIMES DAILY PRN
Qty: 21 CAPSULE | Refills: 0 | Status: SHIPPED | OUTPATIENT
Start: 2025-01-04 | End: 2025-01-11

## 2025-01-04 RX ORDER — BENZONATATE 100 MG/1
200 CAPSULE ORAL
Status: COMPLETED | OUTPATIENT
Start: 2025-01-04 | End: 2025-01-04

## 2025-01-04 RX ADMIN — BENZONATATE 200 MG: 100 CAPSULE ORAL at 17:19

## 2025-01-04 ASSESSMENT — PAIN - FUNCTIONAL ASSESSMENT: PAIN_FUNCTIONAL_ASSESSMENT: 0-10

## 2025-01-04 ASSESSMENT — PAIN SCALES - GENERAL: PAINLEVEL_OUTOF10: 5

## 2025-01-04 ASSESSMENT — PAIN DESCRIPTION - LOCATION: LOCATION: GENERALIZED

## 2025-01-04 ASSESSMENT — LIFESTYLE VARIABLES
HOW OFTEN DO YOU HAVE A DRINK CONTAINING ALCOHOL: NEVER
HOW MANY STANDARD DRINKS CONTAINING ALCOHOL DO YOU HAVE ON A TYPICAL DAY: PATIENT DOES NOT DRINK

## 2025-01-04 NOTE — DISCHARGE INSTRUCTIONS
Your strep, flu and Covid are negative. You have a viral upper respiratory infection.    Drink at least 8 cups of water a day. Take Tylenol and Ibuprofen as needed for pain/fever.    Take medication as prescribed.    Call, make an appointment and follow up with your doctor in 1-2 days for a recheck.    Return to ER for worsening symptoms, chest pain, shortness of breath or any other concerns.

## 2025-01-04 NOTE — ED PROVIDER NOTES
Emergency Department Provider Note       PCP: None, None   Age: 45 y.o.   Sex: female     DISPOSITION Decision To Discharge 01/04/2025 06:18:09 PM   DISPOSITION CONDITION Stable            ICD-10-CM    1. Acute upper respiratory infection  J06.9           Medical Decision Making     Pt presents to ER with c/o URI symptoms that started yesterday. Flu, Covid and strep ordered.     Swabs are negative. Pt has a viral URI. Discussed results and dx with pt. Discussed rx for cough medication. Pt is to f/up with pcp or return to ER for worsening symptoms. Pt verbalized understanding and agrees with plan.     1 acute complicated illness or injury.  Prescription drug management performed.  Shared medical decision making was utilized in creating the patients health plan today.  I independently ordered and reviewed each unique test.    I reviewed external records: provider visit note from PCP.                     History     46 y/o F presents to eR with c/o URI symptoms that started yesterday. Pt reports runny nose, sore throat and cough. States she coughs a lot but is using an inhaler that has helped with the coughing. Denies hx of asthma. Denies other symptoms. Pt states co-workers are sick.    The history is provided by the patient.     Physical Exam     Vitals signs and nursing note reviewed:  Vitals:    01/04/25 1648 01/04/25 1900   BP: (!) 154/78 (!) 150/77   Pulse: 76 76   Resp: 19 19   Temp: 98.4 °F (36.9 °C) 98.4 °F (36.9 °C)   TempSrc: Oral Oral   SpO2: 98% 98%   Weight: 113.4 kg (250 lb)    Height: 1.626 m (5' 4\")       Physical Exam  Vitals and nursing note reviewed.   Constitutional:       General: She is not in acute distress.     Appearance: Normal appearance. She is not toxic-appearing.   HENT:      Head: Normocephalic and atraumatic.      Nose: Nose normal.      Mouth/Throat:      Mouth: Mucous membranes are moist.      Pharynx: Uvula midline. Posterior oropharyngeal erythema present. No pharyngeal swelling,

## 2025-01-04 NOTE — ED TRIAGE NOTES
Pt arrives ambulatory to triage with c/o cough that causes her to become short of breath and body aches. States she has been using her inhaler with relief of shortness of breath but not the cough. Reports coworkers with similar symptoms. Denies n/v/d. Has been taking OTC cough medication without relief of cough.

## 2025-01-05 NOTE — ED NOTES
Patient mobility status  with no difficulty.     I have reviewed discharge instructions with the patient.  The patient verbalized understanding.    Patient left ED via Discharge Method: ambulatory to Home with  self .    Opportunity for questions and clarification provided.     Patient given 1 scripts.       Vivien Nettles RN  01/04/25 [id number]    
no concerns

## 2025-03-19 ENCOUNTER — APPOINTMENT (OUTPATIENT)
Dept: URBAN - METROPOLITAN AREA CLINIC 24 | Facility: CLINIC | Age: 45
Setting detail: DERMATOLOGY
End: 2025-03-19

## 2025-03-19 DIAGNOSIS — L72.0 EPIDERMAL CYST: ICD-10-CM

## 2025-03-19 PROCEDURE — ? ORDER TESTS

## 2025-03-19 PROCEDURE — ? COUNSELING

## 2025-03-19 PROCEDURE — ? INCISION AND DRAINAGE

## 2025-03-19 PROCEDURE — ? PRESCRIPTION MEDICATION MANAGEMENT

## 2025-03-19 PROCEDURE — ? PRESCRIPTION

## 2025-03-19 PROCEDURE — 10060 I&D ABSCESS SIMPLE/SINGLE: CPT

## 2025-03-19 RX ORDER — MUPIROCIN 20 MG/G
OINTMENT TOPICAL
Qty: 22 | Refills: 5 | Status: ERX | COMMUNITY
Start: 2025-03-19

## 2025-03-19 RX ORDER — DOXYCYCLINE 100 MG/1
TABLET, FILM COATED ORAL
Qty: 28 | Refills: 0 | Status: ERX | COMMUNITY
Start: 2025-03-19

## 2025-03-19 RX ADMIN — MUPIROCIN: 20 OINTMENT TOPICAL at 00:00

## 2025-03-19 RX ADMIN — DOXYCYCLINE: 100 TABLET, FILM COATED ORAL at 00:00

## 2025-03-19 ASSESSMENT — LOCATION DETAILED DESCRIPTION DERM: LOCATION DETAILED: SUPERIOR THORACIC SPINE

## 2025-03-19 ASSESSMENT — LOCATION SIMPLE DESCRIPTION DERM: LOCATION SIMPLE: UPPER BACK

## 2025-03-19 ASSESSMENT — LOCATION ZONE DERM: LOCATION ZONE: TRUNK

## 2025-03-19 NOTE — PROCEDURE: ORDER TESTS
Billing Type: Third-Party Bill
Performing Laboratory: 0
Bill For Surgical Tray: no
Expected Date Of Service: 03/19/2025

## 2025-03-19 NOTE — PROCEDURE: PRESCRIPTION MEDICATION MANAGEMENT
Initiate Treatment: Doxycycline 100mg twice daily x 2 weeks\\nMupirocin ointment twice daily until resolved
Detail Level: Detailed
Render In Strict Bullet Format?: No

## 2025-04-23 ENCOUNTER — APPOINTMENT (OUTPATIENT)
Dept: URBAN - METROPOLITAN AREA CLINIC 24 | Facility: CLINIC | Age: 45
Setting detail: DERMATOLOGY
End: 2025-04-23

## 2025-04-23 DIAGNOSIS — L72.0 EPIDERMAL CYST: ICD-10-CM | Status: IMPROVED

## 2025-04-23 PROCEDURE — ? DEFER

## 2025-04-23 PROCEDURE — 11900 INJECT SKIN LESIONS </W 7: CPT

## 2025-04-23 PROCEDURE — ? INTRALESIONAL KENALOG

## 2025-04-23 PROCEDURE — ? COUNSELING

## 2025-04-23 PROCEDURE — ? PRESCRIPTION MEDICATION MANAGEMENT

## 2025-04-23 ASSESSMENT — LOCATION ZONE DERM: LOCATION ZONE: TRUNK

## 2025-04-23 ASSESSMENT — LOCATION SIMPLE DESCRIPTION DERM: LOCATION SIMPLE: UPPER BACK

## 2025-04-23 ASSESSMENT — LOCATION DETAILED DESCRIPTION DERM: LOCATION DETAILED: SUPERIOR THORACIC SPINE

## 2025-04-23 NOTE — PROCEDURE: DEFER
Detail Level: Detailed
Size Of Lesion In Cm (Optional): 0
Procedure To Be Performed At Next Visit: Excision
Introduction Text (Please End With A Colon): The following procedure was deferred:
Instructions (Optional): Consider excision in fall. Will need a separate 30 min procedure appt

## 2025-04-23 NOTE — PROCEDURE: INTRALESIONAL KENALOG
Treatment Number (Optional): 1
Total Volume (Ccs): 0.1
Which Kenalog Vial Was Used?: Kenalog 10 mg/ml (5 ml vial)
Kenalog Preparation: Kenalog
Require Ndc Code?: No
How Many Mls Were Removed From The 40 Mg/Ml (5ml) Vial When Preparing The Injectable Solution?: 0
Detail Level: Detailed
Administered By (Optional): KIKO
Validate Note Data When Using Inventory: Yes
Medical Necessity Clause: This procedure was medically necessary because the lesions that were treated were:
Kenalog Type Of Vial: Multiple Dose
Consent: The risks of atrophy were reviewed with the patient.
Ndc# For [place] Only: [address]-44
Concentration Of Kenalog Solution Injected (Mg/Ml): 5.0
Show Inventory Tab: Hide

## 2025-04-23 NOTE — PROCEDURE: PRESCRIPTION MEDICATION MANAGEMENT
Discontinue Regimen: Doxycycline 100mg twice daily x 2 weeks\\nMupirocin ointment twice daily until resolved
Detail Level: Detailed
Plan: Improved and size has decreased since last visit \\nPlan for ILK injection to help decrease size even more\\n\\nWait a month to let ILK fully work\\nDiscussed possible excision in the fall/ when pt is able to has stitches for 2 weeks
Render In Strict Bullet Format?: No

## 2025-08-09 ENCOUNTER — HOSPITAL ENCOUNTER (EMERGENCY)
Age: 45
Discharge: HOME | End: 2025-08-09

## 2025-08-09 ENCOUNTER — APPOINTMENT (OUTPATIENT)
Dept: GENERAL RADIOLOGY | Age: 45
End: 2025-08-09

## 2025-08-09 VITALS
WEIGHT: 280 LBS | RESPIRATION RATE: 17 BRPM | OXYGEN SATURATION: 97 % | SYSTOLIC BLOOD PRESSURE: 153 MMHG | HEIGHT: 64 IN | TEMPERATURE: 97.7 F | DIASTOLIC BLOOD PRESSURE: 105 MMHG | HEART RATE: 68 BPM | BODY MASS INDEX: 47.8 KG/M2

## 2025-08-09 DIAGNOSIS — R05.1 ACUTE COUGH: Primary | ICD-10-CM

## 2025-08-09 LAB
APPEARANCE UR: ABNORMAL
BILIRUB UR QL: NEGATIVE
COLOR UR: ABNORMAL
FLUAV RNA SPEC QL NAA+PROBE: NOT DETECTED
FLUBV RNA SPEC QL NAA+PROBE: NOT DETECTED
GLUCOSE UR STRIP.AUTO-MCNC: NEGATIVE MG/DL
HCG UR QL: NEGATIVE
HGB UR QL STRIP: NEGATIVE
KETONES UR QL STRIP.AUTO: NEGATIVE MG/DL
LEUKOCYTE ESTERASE UR QL STRIP.AUTO: NEGATIVE
NITRITE UR QL STRIP.AUTO: NEGATIVE
PH UR STRIP: 5.5 (ref 5–9)
PROT UR STRIP-MCNC: NEGATIVE MG/DL
SARS-COV-2 RDRP RESP QL NAA+PROBE: NOT DETECTED
SOURCE: NORMAL
SP GR UR REFRACTOMETRY: 1.03 (ref 1–1.02)
UROBILINOGEN UR QL STRIP.AUTO: 1 EU/DL (ref 0.2–1)

## 2025-08-09 PROCEDURE — 81025 URINE PREGNANCY TEST: CPT

## 2025-08-09 PROCEDURE — 87636 SARSCOV2 & INF A&B AMP PRB: CPT

## 2025-08-09 PROCEDURE — 99284 EMERGENCY DEPT VISIT MOD MDM: CPT

## 2025-08-09 PROCEDURE — 81003 URINALYSIS AUTO W/O SCOPE: CPT

## 2025-08-09 PROCEDURE — 71046 X-RAY EXAM CHEST 2 VIEWS: CPT

## 2025-08-09 PROCEDURE — 6370000000 HC RX 637 (ALT 250 FOR IP): Performed by: PHYSICIAN ASSISTANT

## 2025-08-09 RX ORDER — BENZONATATE 200 MG/1
200 CAPSULE ORAL 3 TIMES DAILY PRN
Qty: 30 CAPSULE | Refills: 0 | Status: SHIPPED | OUTPATIENT
Start: 2025-08-09 | End: 2025-08-19

## 2025-08-09 RX ORDER — BENZONATATE 100 MG/1
200 CAPSULE ORAL
Status: COMPLETED | OUTPATIENT
Start: 2025-08-09 | End: 2025-08-09

## 2025-08-09 RX ORDER — PREDNISONE 50 MG/1
50 TABLET ORAL DAILY
Qty: 5 TABLET | Refills: 0 | Status: SHIPPED | OUTPATIENT
Start: 2025-08-09 | End: 2025-08-14

## 2025-08-09 RX ADMIN — BENZONATATE 200 MG: 100 CAPSULE ORAL at 11:11

## 2025-08-09 ASSESSMENT — PAIN - FUNCTIONAL ASSESSMENT
PAIN_FUNCTIONAL_ASSESSMENT: 0-10
PAIN_FUNCTIONAL_ASSESSMENT: ACTIVITIES ARE NOT PREVENTED

## 2025-08-09 ASSESSMENT — PAIN DESCRIPTION - FREQUENCY: FREQUENCY: CONTINUOUS

## 2025-08-09 ASSESSMENT — PAIN SCALES - GENERAL: PAINLEVEL_OUTOF10: 0

## 2025-08-09 ASSESSMENT — LIFESTYLE VARIABLES: HOW MANY STANDARD DRINKS CONTAINING ALCOHOL DO YOU HAVE ON A TYPICAL DAY: PATIENT DOES NOT DRINK
